# Patient Record
Sex: FEMALE | Race: OTHER
[De-identification: names, ages, dates, MRNs, and addresses within clinical notes are randomized per-mention and may not be internally consistent; named-entity substitution may affect disease eponyms.]

---

## 2018-08-01 ENCOUNTER — HOSPITAL ENCOUNTER (EMERGENCY)
Dept: HOSPITAL 62 - ER | Age: 23
Discharge: OTHER INSTITUTION | End: 2018-08-01
Payer: MEDICAID

## 2018-08-01 ENCOUNTER — HOSPITAL ENCOUNTER (OUTPATIENT)
Dept: HOSPITAL 62 - LC | Age: 23
Discharge: HOME | End: 2018-08-01
Attending: OBSTETRICS & GYNECOLOGY
Payer: MEDICAID

## 2018-08-01 VITALS — SYSTOLIC BLOOD PRESSURE: 125 MMHG | DIASTOLIC BLOOD PRESSURE: 71 MMHG

## 2018-08-01 DIAGNOSIS — O99.282: Primary | ICD-10-CM

## 2018-08-01 DIAGNOSIS — R10.2: ICD-10-CM

## 2018-08-01 DIAGNOSIS — Z3A.22: ICD-10-CM

## 2018-08-01 DIAGNOSIS — O26.892: Primary | ICD-10-CM

## 2018-08-01 DIAGNOSIS — E86.0: ICD-10-CM

## 2018-08-01 LAB
ADD MANUAL DIFF: NO
ALBUMIN SERPL-MCNC: 3.6 G/DL (ref 3.5–5)
ALP SERPL-CCNC: 50 U/L (ref 38–126)
ALT SERPL-CCNC: 19 U/L (ref 9–52)
ANION GAP SERPL CALC-SCNC: 12 MMOL/L (ref 5–19)
APPEARANCE UR: (no result)
APTT PPP: YELLOW S
AST SERPL-CCNC: 13 U/L (ref 14–36)
BASOPHILS # BLD AUTO: 0 10^3/UL (ref 0–0.2)
BASOPHILS NFR BLD AUTO: 0.2 % (ref 0–2)
BILIRUB DIRECT SERPL-MCNC: 0.2 MG/DL (ref 0–0.4)
BILIRUB SERPL-MCNC: 0.2 MG/DL (ref 0.2–1.3)
BILIRUB UR QL STRIP: NEGATIVE
BUN SERPL-MCNC: 7 MG/DL (ref 7–20)
CALCIUM: 9.5 MG/DL (ref 8.4–10.2)
CHLORIDE SERPL-SCNC: 104 MMOL/L (ref 98–107)
CO2 SERPL-SCNC: 24 MMOL/L (ref 22–30)
EOSINOPHIL # BLD AUTO: 0.1 10^3/UL (ref 0–0.6)
EOSINOPHIL NFR BLD AUTO: 0.9 % (ref 0–6)
EPITHELIALS (WET MOUNT): (no result)
ERYTHROCYTE [DISTWIDTH] IN BLOOD BY AUTOMATED COUNT: 13.7 % (ref 11.5–14)
GLUCOSE SERPL-MCNC: 92 MG/DL (ref 75–110)
GLUCOSE UR STRIP-MCNC: NEGATIVE MG/DL
HCT VFR BLD CALC: 33.9 % (ref 36–47)
HGB BLD-MCNC: 11.4 G/DL (ref 12–15.5)
KETONES UR STRIP-MCNC: NEGATIVE MG/DL
LYMPHOCYTES # BLD AUTO: 2.3 10^3/UL (ref 0.5–4.7)
LYMPHOCYTES NFR BLD AUTO: 16.2 % (ref 13–45)
MCH RBC QN AUTO: 28.6 PG (ref 27–33.4)
MCHC RBC AUTO-ENTMCNC: 33.8 G/DL (ref 32–36)
MCV RBC AUTO: 85 FL (ref 80–97)
MONOCYTES # BLD AUTO: 0.9 10^3/UL (ref 0.1–1.4)
MONOCYTES NFR BLD AUTO: 6.2 % (ref 3–13)
NEUTROPHILS # BLD AUTO: 11 10^3/UL (ref 1.7–8.2)
NEUTS SEG NFR BLD AUTO: 76.5 % (ref 42–78)
NITRITE UR QL STRIP: NEGATIVE
PH UR STRIP: 5 [PH] (ref 5–9)
PLATELET # BLD: 343 10^3/UL (ref 150–450)
POTASSIUM SERPL-SCNC: 4 MMOL/L (ref 3.6–5)
PROT SERPL-MCNC: 6.8 G/DL (ref 6.3–8.2)
PROT UR STRIP-MCNC: NEGATIVE MG/DL
RBC # BLD AUTO: 4 10^6/UL (ref 3.72–5.28)
SODIUM SERPL-SCNC: 139.9 MMOL/L (ref 137–145)
SP GR UR STRIP: 1.02
T.VAGINALIS (WET MOUNT): (no result)
TOTAL CELLS COUNTED % (AUTO): 100 %
UROBILINOGEN UR-MCNC: NEGATIVE MG/DL (ref ?–2)
WBC # BLD AUTO: 14.4 10^3/UL (ref 4–10.5)
WBCS (WET MOUNT): (no result)
YEAST (WET MOUNT): (no result)

## 2018-08-01 PROCEDURE — 84702 CHORIONIC GONADOTROPIN TEST: CPT

## 2018-08-01 PROCEDURE — 36415 COLL VENOUS BLD VENIPUNCTURE: CPT

## 2018-08-01 PROCEDURE — 86900 BLOOD TYPING SEROLOGIC ABO: CPT

## 2018-08-01 PROCEDURE — 81001 URINALYSIS AUTO W/SCOPE: CPT

## 2018-08-01 PROCEDURE — 76805 OB US >/= 14 WKS SNGL FETUS: CPT

## 2018-08-01 PROCEDURE — 99284 EMERGENCY DEPT VISIT MOD MDM: CPT

## 2018-08-01 PROCEDURE — 86901 BLOOD TYPING SEROLOGIC RH(D): CPT

## 2018-08-01 PROCEDURE — 80053 COMPREHEN METABOLIC PANEL: CPT

## 2018-08-01 PROCEDURE — 87491 CHLMYD TRACH DNA AMP PROBE: CPT

## 2018-08-01 PROCEDURE — 87591 N.GONORRHOEAE DNA AMP PROB: CPT

## 2018-08-01 PROCEDURE — 87210 SMEAR WET MOUNT SALINE/INK: CPT

## 2018-08-01 PROCEDURE — 85025 COMPLETE CBC W/AUTO DIFF WBC: CPT

## 2018-08-01 NOTE — RADIOLOGY REPORT (SQ)
EXAM DESCRIPTION:  U/S OB 14+ TRNABD 1GES W/O DOP



COMPLETED DATE/TIME:  8/1/2018 3:23 pm



REASON FOR STUDY:  lower abdominal pain



COMPARISON:  None.



TECHNIQUE:  Static and Dynamic grayscale imaging performed of gravid uterus using transabdominal appr
oach.  Additional selected color Doppler and spectral images recorded.  All stored on PACS.



LIMITATIONS:  None.



FINDINGS:  EGA: 22 weeks 4 days

JOE: 12/1/2018

EFW: 558+/- 83 grams

PERCENTILE: Not calculated.

RANDAL: Adequate

PLACENTA: Posterior  GRADE: I

FETAL PRESENTATION: Variable.

FETAL ANATOMY:

FETAL HEART RATE: 139 beats per minute.

FOUR CHAMBER HEART: Not confirmed.

THREE VESSEL CORD: Yes.

CORD INSERTION: Visualized.

KIDNEYS AND BLADDER: Visualized. Appear normal.

STOMACH: Visualized. Appears normal.

SPINE: Normal as visualized.

BRAIN AND LATERAL VENTRICLES: Visualized. Appear normal.

OTHER: No other significant finding.

MATERNAL ADNEXA: Maternal ovaries not visualized.

CERVICAL LENGTH: 3 cm.   Closed.

OTHER: No other significant finding.



IMPRESSION:  LIVING INTRAUTERINE PREGNANCY.

ESTIMATED GESTATIONAL AGE 22 weeks 4 days.

NO VISUALIZED ANOMALIES.

Trimester of pregnancy: Second trimester - 13 weeks 1 day to 27 weeks 6 days.



TECHNICAL DOCUMENTATION:  JOB ID:  [id number]

 2011 [employer]- All Rights Reserved



Reading location - IP/workstation name: [email or web address]

## 2018-08-01 NOTE — ER DOCUMENT REPORT
ED GI/





- General


Chief Complaint: Lower Abdominal Pain


Stated Complaint: STOMACH CRAMPING


Time Seen by Provider: 18 15:25


Mode of Arrival: Ambulatory


Notes: 





23-year-old female presents to ED for lower abdominal pain and pregnancy.  She 

states it started about 8 hours ago.  She states this is her first pregnancy 

and she has been treated at another hospital in another state for this 

pregnancy but has not been seen by the OB/GYN in [place].  She states 

she has been to the health department.  She states she was told she was 19 

weeks.  Ultrasound shows her at 22 weeks 4 days.  She has some tenderness to 

the lower abdomen abdomen is soft she does have a gravid uterus.  She denies 

any vaginal bleeding or any vaginal pain.  She denies urinary symptoms, nausea 

or vomiting, dizziness, headaches, any problems with her blood pressure.


TRAVEL OUTSIDE OF THE [place] IN LAST 30 DAYS: No





- HPI


Patient complains to provider of: Abdominal pain, Pelvic pain, Pregnant.  No: 

Urinary retention, Vaginal bleeding, Vaginal discharge, Vaginal pain, Vomiting


Onset: This morning


Timing/Duration: Persistent


Quality of pain: Cramping


Severity at maximum: Moderate


Severity in ED: Moderate


Pain Level: 3


Location: Pelvis.  No: Vaginal


Vaginal bleeding (Compared to normal period): None


: 1


Para: 0


Fetal heart tones (bpm): 139


EDC: 18


OB ultrasound done: Yes


Associated symptoms: denies: Nausea, Radiates to back, Urinary hesitancy, 

Urinary frequency, Urinary retention, Urinary urgency, Vaginal discharge, 

Vomiting


Exacerbated by: Movement


Relieved by: Denies


Similar symptoms previously: No


Recently seen / treated by doctor: No





- Related Data


Allergies/Adverse Reactions: 


 





No Known Allergies Allergy (Unverified 18 16:32)


 











Past Medical History





- General


Information source: Patient, Relative





- Social History


Smoking Status: Never Smoker


Cigarette use (# per day): No


Chew tobacco use (# tins/day): No


Smoking Education Provided: No


Frequency of alcohol use: None


Drug Abuse: None


Lives with: Family


Family History: Reviewed & Not Pertinent


Patient has suicidal ideation: No


Patient has homicidal ideation: No





- Past Medical History


Cardiac Medical History: Reports: None


Pulmonary Medical History: Reports: None


EENT Medical History: Reports: None


Neurological Medical History: Reports: None


Endocrine Medical History: Reports: None


Renal/ Medical History: Reports: None


Malignancy Medical History: Reports: None


GI Medical History: Reports: None


Musculoskeletal Medical History: Reports None


Skin Medical History: Reports Other - pilonidal cyst


Psychiatric Medical History: Reports: None


Traumatic Medical History: Reports: None


Infectious Medical History: Reports: None


Past Surgical History: Reports: Other - pilonidal cyst





- Immunizations


Immunizations up to date: Yes





Review of Systems





- Review of Systems


Constitutional: No symptoms reported


EENT: No symptoms reported


Cardiovascular: No symptoms reported


Respiratory: No symptoms reported


Gastrointestinal: Abdominal pain


Genitourinary: No symptoms reported


Female Genitourinary: Pregnant


Musculoskeletal: No symptoms reported


Skin: No symptoms reported


Hematologic/Lymphatic: No symptoms reported


Neurological/Psychological: No symptoms reported


-: Yes All other systems reviewed and negative





Physical Exam





- Vital signs


Vitals: 


 











Temp Pulse Resp BP Pulse Ox


 


 98.0 F   85   16   125/71   100 


 


 18 12:30  18 12:30  18 12:30  18 12:30  18 12:30











Interpretation: Normal





- General


General appearance: Appears well, Alert





- HEENT


Head: Normocephalic, Atraumatic


Eyes: Normal


Pupils: PERRL





- Respiratory


Respiratory status: No respiratory distress


Chest status: Nontender


Breath sounds: Normal


Chest palpation: Normal





- Cardiovascular


Rhythm: Regular


Heart sounds: Normal auscultation


Murmur: No





- Abdominal


Inspection: Normal, Gravid female


Distension: No distension


Bowel sounds: Normal


Tenderness: Tender - low abdomen bilateral


Organomegaly: No organomegaly





- Back


Back: Normal, Nontender





- Extremities


General upper extremity: Normal inspection, Nontender, Normal color, Normal ROM

, Normal temperature


General lower extremity: Normal inspection, Nontender, Normal color, Normal ROM

, Normal temperature, Normal weight bearing.  No: Zak's sign





- Neurological


Neuro grossly intact: Yes


Cognition: Normal


Orientation: AAOx4


Aspen Coma Scale Eye Opening: Spontaneous


Gi Coma Scale Verbal: Oriented


Aspen Coma Scale Motor: Obeys Commands


Aspen Coma Scale Total: 15


Speech: Normal


Motor strength normal: LUE, RUE, LLE, RLE


Sensory: Normal





- Psychological


Associated symptoms: Normal affect, Normal mood





- Skin


Skin Temperature: Warm


Skin Moisture: Dry


Skin Color: Normal





Course





- Re-evaluation


Re-evalutation: 





18 16:00


Spoke with Dr. Louie concerning this 22 week 4 day first-time pregnant woman 

who has lower abdominal pain with no vaginal bleeding no fevers no urinary 

symptoms no nausea no vomiting just abdominal cramping.  Dr. Louie stated that 

the patient should be sent to labor and delivery to be evaluated.  This was 

discussed with patient and the baby's father.  Patient will be sent to labor 

and delivery.  Labor and delivery was called and notified the patient would be 

coming up.





- Vital Signs


Vital signs: 


 











Temp Pulse Resp BP Pulse Ox


 


 98.0 F   85   16   125/71   100 


 


 18 12:30  18 12:30  18 12:30  18 12:30  18 12:30














- Laboratory


Result Diagrams: 


 18 14:00





 18 14:00


Laboratory results interpreted by me: 


 











  18





  14:00 14:00 14:00


 


WBC  14.4 H  


 


Hgb  11.4 L  


 


Hct  33.9 L  


 


Absolute Neutrophils  11.0 H  


 


Creatinine   0.50 L 


 


AST   13 L 


 


Beta HCG, Quant   5702.30 H 


 


Ur Leukocyte Esterase    TRACE H














- Diagnostic Test


Radiology reviewed: Image reviewed, Reports reviewed





Discharge





- Discharge


Clinical Impression: 


 Lower abdominal cramping 22 weeks pregna





Condition: Stable


Disposition: OTHER


Additional Instructions: 


You will be discharged from the emergency room I have given you a copy of your 

labs and ultrasound you will be taken upstairs to labor and delivery for them 

to do a labor check.





FOLLOW-UP CARE:


If you have been referred to a physician for follow-up care, call the physician

s office for an appointment as you were instructed or within the next two days.

  If you experience worsening or a significant change in your symptoms, notify 

the physician immediately or return to the Emergency Department at any time for 

re-evaluation.


Referrals: 


[facility] [Provider Group] - Follow up as needed

## 2018-08-01 NOTE — ER DOCUMENT REPORT
ED Medical Screen (RME)





- General


Chief Complaint: Lower Abdominal Pain


Stated Complaint: STOMACH CRAMPING


Mode of Arrival: Ambulatory


Information source: Patient, Relative


TRAVEL OUTSIDE OF THE [place] IN LAST 30 DAYS: No





- HPI


Notes: 





18 13:44


23-year-old female  1 para 0, 5 months pregnant presents to the ED with 

complaints of lower abdominal cramping that started approximately 8 hours ago.  

This is patient's first pregnancy, has never been evaluated by an OB/GYN.  

Reports decreased fetal movement today.  Is taking prenatal vitamins.  Denies 

any fevers or chills, vaginal bleeding, vaginal pain.  Patient has not been 

followed by the health department due to patient reporting "I was too 

overweight for the OB/GYN ankle and was referred.  Patient did not follow-up 

with OB/GYN.  Denies fevers, chills,  chest pain,palpitations,  shortness of 

breath, dyspnea, nausea, vomiting, diarrhea, abdominal pain, hematuria,blurred 

vision, double vision, loss of vision, speech changes, LH, dizziness, syncope, 

headaches, wheezing, ST, URI, neck pain, weakness, bowel or bladder dysfunction

, saddle anesthesia, numbness or tingling in bilateral upper or lower 

extremities equally, muscle paralysis, weakness in bilateral upper or lower 

extremities equally or rash. Denies IV drug use.





I have greeted and performed a rapid initial assessment of this patient.  A 

comprehensive ED assessment and evaluation of the patient, analysis of test 

results and completion of medical decision making process will be conducted by 

an additional ED providers.





Physical Exam





- Vital signs


Vitals: 





 











Temp Pulse Resp BP Pulse Ox


 


 98.0 F   85   16   125/71   100 


 


 18 12:18 12:18 12:18 12:18 12:30














- Respiratory


Respiratory status: No respiratory distress


Chest status: Nontender


Breath sounds: Normal


Chest palpation: Normal





- Cardiovascular


Rhythm: Regular


Heart sounds: Normal auscultation


Murmur: No


Normal capillary refill: Yes





- Abdominal


Inspection: Normal


Tenderness: Other - Lower abdominal pain on palpation bilaterally.  No CVA 

tenderness appreciated bilaterally





Course





- Vital Signs


Vital signs: 





 











Temp Pulse Resp BP Pulse Ox


 


 98.0 F   85   16   125/71   100 


 


 18 12:18 12:18 12:30  18 12:30  18 12:30